# Patient Record
Sex: FEMALE
[De-identification: names, ages, dates, MRNs, and addresses within clinical notes are randomized per-mention and may not be internally consistent; named-entity substitution may affect disease eponyms.]

---

## 2020-03-19 ENCOUNTER — HOSPITAL ENCOUNTER (OUTPATIENT)
Dept: HOSPITAL 92 - ERS | Age: 75
Setting detail: OBSERVATION
LOS: 1 days | Discharge: HOME | End: 2020-03-20
Attending: INTERNAL MEDICINE | Admitting: INTERNAL MEDICINE
Payer: MEDICARE

## 2020-03-19 VITALS — BODY MASS INDEX: 31.4 KG/M2

## 2020-03-19 DIAGNOSIS — Z88.1: ICD-10-CM

## 2020-03-19 DIAGNOSIS — E66.9: ICD-10-CM

## 2020-03-19 DIAGNOSIS — E11.22: ICD-10-CM

## 2020-03-19 DIAGNOSIS — Z99.2: ICD-10-CM

## 2020-03-19 DIAGNOSIS — E87.5: ICD-10-CM

## 2020-03-19 DIAGNOSIS — I50.9: ICD-10-CM

## 2020-03-19 DIAGNOSIS — E87.70: Primary | ICD-10-CM

## 2020-03-19 DIAGNOSIS — Z95.0: ICD-10-CM

## 2020-03-19 DIAGNOSIS — I13.2: ICD-10-CM

## 2020-03-19 DIAGNOSIS — I16.0: ICD-10-CM

## 2020-03-19 DIAGNOSIS — N18.6: ICD-10-CM

## 2020-03-19 LAB
ALBUMIN SERPL BCG-MCNC: 4.1 G/DL (ref 3.4–4.8)
ALP SERPL-CCNC: 98 U/L (ref 40–110)
ALT SERPL W P-5'-P-CCNC: 10 U/L (ref 8–55)
ANION GAP SERPL CALC-SCNC: 17 MMOL/L (ref 10–20)
AST SERPL-CCNC: 13 U/L (ref 5–34)
BASOPHILS # BLD AUTO: 0 THOU/UL (ref 0–0.2)
BASOPHILS NFR BLD AUTO: 0.3 % (ref 0–1)
BILIRUB SERPL-MCNC: 0.9 MG/DL (ref 0.2–1.2)
BUN SERPL-MCNC: 37 MG/DL (ref 9.8–20.1)
CALCIUM SERPL-MCNC: 9.9 MG/DL (ref 7.8–10.44)
CHLORIDE SERPL-SCNC: 108 MMOL/L (ref 98–107)
CK MB SERPL-MCNC: 2.7 NG/ML (ref 0–6.6)
CK SERPL-CCNC: 100 U/L (ref 29–168)
CO2 SERPL-SCNC: 21 MMOL/L (ref 23–31)
COMM CRITICAL RESULTS DOC: (no result)
CREAT CL PREDICTED SERPL C-G-VRATE: 0 ML/MIN (ref 70–130)
EOSINOPHIL # BLD AUTO: 0.1 THOU/UL (ref 0–0.7)
EOSINOPHIL NFR BLD AUTO: 1.4 % (ref 0–10)
GLOBULIN SER CALC-MCNC: 3.9 G/DL (ref 2.4–3.5)
GLUCOSE SERPL-MCNC: 104 MG/DL (ref 83–110)
HBSAG INDEX: 0.21 S/CO (ref 0–0.99)
HGB BLD-MCNC: 13.1 G/DL (ref 12–16)
LIPASE SERPL-CCNC: 14 U/L (ref 8–78)
LYMPHOCYTES # BLD: 1.4 THOU/UL (ref 1.2–3.4)
LYMPHOCYTES NFR BLD AUTO: 15.5 % (ref 21–51)
MCH RBC QN AUTO: 25 PG (ref 27–31)
MCV RBC AUTO: 81.9 FL (ref 78–98)
MDIFF COMPLETE?: YES
MONOCYTES # BLD AUTO: 0.7 THOU/UL (ref 0.11–0.59)
MONOCYTES NFR BLD AUTO: 7.9 % (ref 0–10)
NEUTROPHILS # BLD AUTO: 6.8 THOU/UL (ref 1.4–6.5)
NEUTROPHILS NFR BLD AUTO: 75 % (ref 42–75)
PLATELET # BLD AUTO: 204 THOU/UL (ref 130–400)
POLYCHROMASIA BLD QL SMEAR: (no result) (100X)
POTASSIUM SERPL-SCNC: 5 MMOL/L (ref 3.5–5.1)
RBC # BLD AUTO: 5.24 MILL/UL (ref 4.2–5.4)
SODIUM SERPL-SCNC: 141 MMOL/L (ref 136–145)
TARGETS BLD QL SMEAR: (no result) (100X)
TROPONIN I SERPL DL<=0.01 NG/ML-MCNC: 0.4 NG/ML (ref ?–0.03)
TROPONIN I SERPL DL<=0.01 NG/ML-MCNC: 1.34 NG/ML (ref ?–0.03)
WBC # BLD AUTO: 9.1 THOU/UL (ref 4.8–10.8)

## 2020-03-19 PROCEDURE — G0378 HOSPITAL OBSERVATION PER HR: HCPCS

## 2020-03-19 PROCEDURE — 94760 N-INVAS EAR/PLS OXIMETRY 1: CPT

## 2020-03-19 PROCEDURE — 80069 RENAL FUNCTION PANEL: CPT

## 2020-03-19 PROCEDURE — 90935 HEMODIALYSIS ONE EVALUATION: CPT

## 2020-03-19 PROCEDURE — 83690 ASSAY OF LIPASE: CPT

## 2020-03-19 PROCEDURE — 71045 X-RAY EXAM CHEST 1 VIEW: CPT

## 2020-03-19 PROCEDURE — 82553 CREATINE MB FRACTION: CPT

## 2020-03-19 PROCEDURE — 85025 COMPLETE CBC W/AUTO DIFF WBC: CPT

## 2020-03-19 PROCEDURE — 82550 ASSAY OF CK (CPK): CPT

## 2020-03-19 PROCEDURE — 80053 COMPREHEN METABOLIC PANEL: CPT

## 2020-03-19 PROCEDURE — G0257 UNSCHED DIALYSIS ESRD PT HOS: HCPCS

## 2020-03-19 PROCEDURE — 83880 ASSAY OF NATRIURETIC PEPTIDE: CPT

## 2020-03-19 PROCEDURE — 93005 ELECTROCARDIOGRAM TRACING: CPT

## 2020-03-19 PROCEDURE — 87340 HEPATITIS B SURFACE AG IA: CPT

## 2020-03-19 PROCEDURE — 36415 COLL VENOUS BLD VENIPUNCTURE: CPT

## 2020-03-19 PROCEDURE — 36416 COLLJ CAPILLARY BLOOD SPEC: CPT

## 2020-03-19 PROCEDURE — 84484 ASSAY OF TROPONIN QUANT: CPT

## 2020-03-19 NOTE — CON
DATE OF CONSULTATION:  03/19/2020



REQUESTING PHYSICIAN:  Dr. Nunez.



REASON FOR CONSULTATION:  End-stage renal disease management.



CHIEF COMPLAINT:  Shortness of breath.



HISTORY OF PRESENT ILLNESS:  A 74-year-old female with known history of end-stage

renal disease, on hemodialysis, Tuesday, Thursday and Saturday, who usually resides

in [facility], on a visit here, presents to the emergency room due to worsening

shortness of breath and difficulty breathing.  The patient had visited the area with

the , but her  got sick and was admitted at the hospital.  The patient

was unable to get outpatient hemodialysis in the area due to coronavirus.  Last

hemodialysis was on Thursday, March 12, 2020.  There is no history of chest pain or

fever.  In the emergency room, the patient was noted to be tachycardic with

increased work of breathing.  Further evaluation with CMP showed potassium of 5.0.

BNP was elevated at 1800 and troponin was mildly elevated at 0.4.  Nephrology

consult was requested for evaluation for emergent hemodialysis. 



PAST MEDICAL HISTORY:  

1. Hypertension.

2. Type 2 diabetes mellitus.

3. Restless legs syndrome.

4. Peripheral vascular disease, status post right foot amputation.

5. Hyperlipidemia.

6. Arrhythmias.

7. Obesity.

8. Congestive heart failure.



PAST SURGICAL HISTORY:  

1. Left arm fistula creation.

2. Right forefoot amputation.

3. Cholecystectomy.

4. Bilateral cataractectomy.

5. Pacemaker placement.



FAMILY HISTORY:  Significant history of diabetes and hypertension in both parents.



SOCIAL HISTORY:  The patient lives with spouse in [facility].  Denied smoking.



ALLERGIES:  VANCOMYCIN.



CURRENT HOME MEDICATIONS:  Unable to obtain at this moment.  The patient does not

know exactly what medications she takes. 



REVIEW OF SYSTEMS:  A 12-point review of system performed was negative other than

pertinent positives and negatives included in the history of present illness. 



PHYSICAL EXAMINATION:

VITAL SIGNS:  Blood pressure 143/116, pulse 107, respiratory rate 22, temperature

98.7, SpO2 of 97 on room air.  Of note, on presentation to the ER, initial vitals

were as follows, /82, pulse 94, respiratory rate 18, temperature 99.0, SpO2 of

97 on room air. 

GENERAL:  Obese, elderly female, in mild respiratory distress.  Afebrile, anicteric,

acyanotic.  The patient is fatigued. 

HEENT:  Normocephalic and atraumatic.  Oral mucosa is moist. 

NECK:  Supple with no obvious JVD. 

CARDIOVASCULAR:  Regular rhythm and rate, but tachycardic. 

RESPIRATORY:  Decreased air entry in both bases with some transmitted breath sounds.

 No obvious rhonchi were noted.  Work of breathing is mildly increased with

conversational dyspnea. 

GI:  Obese, soft, nontender, nondistended with normal bowel sounds. 

EXTREMITIES:  Right forefoot amputation noted.  Bilateral distal leg chronic

venostasis changes noted as well.  Moderate bilateral leg edema noted. 

CNS:  Conscious, alert, and oriented x3 with appropriate mental status.  Cranial

nerves 2 through 12 are grossly intact. 



DIAGNOSTIC DATA:  CBC showed WBC count of 9.1, hemoglobin of 13.1, MCV of 81.9, and

platelets of 204. 



CMP showed sodium of 141, potassium 5.0, chloride 108, CO2 of 21, BUN 37, creatinine

5.29, glucose 104, calcium 9.9, total bilirubin 0.9, AST 13, ALT 10, alkaline

phosphatase 98, total protein 8.0, albumin 4.1, globulin 3.9. 



CK is 100, CK-MB 2.7, troponin 0.41. 



Lipase is 14. 



Chest x-ray showed cardiomegaly with vascular congestion.



ASSESSMENT:  

1. Acute respiratory insufficiency:  Due to volume overload.

2. Volume overload:  Due to missed hemodialysis.

3. End-stage renal disease, on hemodialysis on Tuesday, Thursday, and Saturday with

last hemodialysis being a week ago. 

4. Hypertension, control is fair.  Initially elevated, but is improving.

5. Elevated troponin:  Most likely due to demand ischemia related to acute

congestive heart failure. 

6. Diabetes mellitus.



PLAN:  

1. We will plan on dialyzing the patient with 2K bath for at least 3 hours today.

UF as tolerated will be provided. 

2. Other treatment as per primary attending.

3. Further treatment to follow depending on hospital course. 



Many thanks for involving us in the care of this patient.  We will re-evaluate and

follow along with you. 







Job ID:  [id number]

## 2020-03-19 NOTE — RAD
Exam:

Chest one view:



HISTORY:

Dyspnea



FINDINGS:

Cardiomegaly. Left ICD. Mild bilateral vascular congestion. No confluent pneumonia, overt edema, or s
ignificant pleural effusions. Slightly prominent proximal pulmonary artery segments bilaterally. No

prior studies



IMPRESSION:

Minimal cardiomegaly and vascular congestion without other acute process.



Reported By: Dagoberto Leong 

Electronically Signed:  3/19/2020 11:35 AM

## 2020-03-20 VITALS — TEMPERATURE: 98.7 F

## 2020-03-20 VITALS — DIASTOLIC BLOOD PRESSURE: 63 MMHG | SYSTOLIC BLOOD PRESSURE: 145 MMHG

## 2020-03-20 LAB
ALBUMIN SERPL BCG-MCNC: 3.6 G/DL (ref 3.4–4.8)
ANION GAP SERPL CALC-SCNC: 16 MMOL/L (ref 10–20)
BUN SERPL-MCNC: 27 MG/DL (ref 9.8–20.1)
BUN/CREAT SERPL: 6.12
CALCIUM SERPL-MCNC: 9.1 MG/DL (ref 7.8–10.44)
CHLORIDE SERPL-SCNC: 103 MMOL/L (ref 98–107)
CO2 SERPL-SCNC: 27 MMOL/L (ref 23–31)
CREAT CL PREDICTED SERPL C-G-VRATE: 14 ML/MIN (ref 70–130)
GLUCOSE SERPL-MCNC: 110 MG/DL (ref 83–110)
POTASSIUM SERPL-SCNC: 4.5 MMOL/L (ref 3.5–5.1)
SODIUM SERPL-SCNC: 141 MMOL/L (ref 136–145)

## 2020-03-20 NOTE — HP
CHIEF COMPLAINT:  Shortness of breath.



HISTORY OF PRESENT ILLNESS:  The patient is a 74-year-old female with history of

end-stage renal disease on hemodialysis, who was out of town and visiting a 
family

member.  Her last session was a week ago.  She presented to the hospital with

complaints of severe shortness of breath that has been worsening over the past 2

days.  She also complains of orthopnea and palpitations.  She endorses some 
chest

discomfort.  Denies cough or dizziness. 



REVIEW OF SYSTEMS:  Negative except as in HPI.



PAST MEDICAL HISTORY:  End-stage renal disease,  restless legs

syndrome, chronic back pain, and chronic nausea. 



PAST SURGICAL HISTORY:  Positive for right transmetatarsal amputation.



SOCIAL HISTORY:  No smoking, drinking, or illicit drug use.



PHYSICAL EXAMINATION:

GENERAL:  No acutedistress. 

HEENT:  Normocephalic, atraumatic. 

NECK: Supple. 

CARDIOVASCULAR:  Showing tachycardia with regular rhythm. 

CHEST:  Bilateral crackles, nontender, nondistended. 

NEUROLOGIC:  Unremarkable.



IMPRESSION:  

1. Acute respiratory failure with hypoxia.

2. Volume overload due to missed hemodialysis.

3. Hypotension.

4. Diabetes mellitus.

5. Peripheral neuropathy.



PLAN:  The patient will be admitted to the hospital for emergent hemodialysis.  
We

will consult Nephrology.  Rest of the management depends on the hospital 
course. 







Job ID:  [id number]



MTDD

## 2020-03-20 NOTE — PRG
DATE OF SERVICE:  03/20/2020



SERVICE:  Nephrology.



SUBJECTIVE:  A 74-year-old female with end-stage renal disease, on hemodialysis,

admitted due to worsening shortness of breath.  The patient was emergently dialyzed

last night.  Reports feeling better.  Denied nausea, vomiting, chest pain, or

shortness of breath.  Desires to be discharged. 



OBJECTIVE:  VITAL SIGNS:  Temperature of 98.7, pulse 80, respiratory rate 16, blood

pressure 178/69, and SpO2 of 92 on room air. 

GENERAL:  Elderly female, in no obvious distress.  Afebrile.  Anicteric.  Acyanotic. 

HEENT:  Normocephalic and atraumatic.  Oral mucosa is moist. 

CARDIOVASCULAR:  Regular rhythm and rate with normal heart sounds 1 and 2. 

RESPIRATORY:  Fair air entry bilaterally with no obvious crackle or rhonchi or use

of accessory muscles. 

GI:  Obese, soft, nontender, and nondistended with normal bowel sounds. 

EXTREMITIES:  Right forefoot amputation and chronic bilateral venous stasis changes

noted.  No obvious edema appreciated. 

CNS:  Conscious, alert, and oriented x3 with appropriate mental status.



DIAGNOSTIC DATA:  Renal function panel today showed sodium 141, potassium 4.5,

chloride 103, CO2 of 27, BUN 27, creatinine 4.41, glucose 110, calcium 9.1,

phosphorus 4.0, and albumin 3.6. 



ASSESSMENT:  

1. Volume overload with respiratory insufficiency.  This is due to missed

hemodialysis. 

2. End-stage renal disease, on hemodialysis.  The patient was dialyzed yesterday

after one week of missed hemodialysis. 

3. Volume overload:  Resolved with hemodialysis.

4. Hypertensive urgency:  The patient does not know all her medications.  Seems not

to be very compliant with medications. 

5. Hyperkalemia:  Resolved with hemodialysis.



PLAN:  

1. We will start __________ lisinopril 20 mg p.o. daily.

2. Coreg 12.5 mg will be continued as well.

3. Given that the patient is hemodynamically stable with acceptable electrolytes,

there is no need for hemodialysis today.  The patient plans to travel back to her

base and is due to have dialysis tomorrow in line with her outpatient schedule.  It

is okay to discharge the patient from Nephrology point of view.  The patient need to

follow up with regular nephrologist in [facility].  Other treatment as per primary

attending. 







Job ID:  [id number]

## 2020-03-21 NOTE — EKG
Test Reason : 

Blood Pressure : ***/*** mmHG

Vent. Rate : 095 BPM     Atrial Rate : 095 BPM

   P-R Int : 166 ms          QRS Dur : 130 ms

    QT Int : 398 ms       P-R-T Axes : 066 -11 126 degrees

   QTc Int : 500 ms

 

Atrial-sensed ventricular-paced rhythm

Abnormal ECG

 

Confirmed by JORGE ANDINO, LILLIAN ([id number]),  LARS KENNEDY ([id number]) on 3/21/2020 2:58:12 PM

 

Referred By:             Confirmed By:LILLIAN PATEL MD